# Patient Record
Sex: MALE | Race: WHITE | NOT HISPANIC OR LATINO | Employment: STUDENT | URBAN - METROPOLITAN AREA
[De-identification: names, ages, dates, MRNs, and addresses within clinical notes are randomized per-mention and may not be internally consistent; named-entity substitution may affect disease eponyms.]

---

## 2024-05-20 ENCOUNTER — OFFICE VISIT (OUTPATIENT)
Dept: PRIMARY CARE | Facility: CLINIC | Age: 19
End: 2024-05-20
Payer: COMMERCIAL

## 2024-05-20 VITALS
DIASTOLIC BLOOD PRESSURE: 72 MMHG | HEART RATE: 101 BPM | OXYGEN SATURATION: 97 % | BODY MASS INDEX: 20.04 KG/M2 | HEIGHT: 70 IN | WEIGHT: 140 LBS | SYSTOLIC BLOOD PRESSURE: 116 MMHG | TEMPERATURE: 97.7 F

## 2024-05-20 DIAGNOSIS — J02.9 PHARYNGITIS, UNSPECIFIED ETIOLOGY: Primary | ICD-10-CM

## 2024-05-20 LAB — POC RAPID MONO: NEGATIVE

## 2024-05-20 PROCEDURE — 1036F TOBACCO NON-USER: CPT | Performed by: FAMILY MEDICINE

## 2024-05-20 PROCEDURE — 99203 OFFICE O/P NEW LOW 30 MIN: CPT | Performed by: FAMILY MEDICINE

## 2024-05-20 PROCEDURE — 86308 HETEROPHILE ANTIBODY SCREEN: CPT | Mod: QW,59 | Performed by: FAMILY MEDICINE

## 2024-05-20 RX ORDER — AZITHROMYCIN 250 MG/1
TABLET, FILM COATED ORAL DAILY
Qty: 6 TABLET | Refills: 0 | Status: SHIPPED | OUTPATIENT
Start: 2024-05-20 | End: 2024-05-25

## 2024-05-20 ASSESSMENT — PATIENT HEALTH QUESTIONNAIRE - PHQ9
SUM OF ALL RESPONSES TO PHQ9 QUESTIONS 1 AND 2: 0
1. LITTLE INTEREST OR PLEASURE IN DOING THINGS: NOT AT ALL
2. FEELING DOWN, DEPRESSED OR HOPELESS: NOT AT ALL

## 2024-05-20 ASSESSMENT — PAIN SCALES - GENERAL: PAINLEVEL: 0-NO PAIN

## 2024-05-20 NOTE — ASSESSMENT & PLAN NOTE
- History and physical exam relatively reassuring with symptoms likely related to environmental factors/allergens with possible viral infection  -Monospot test performed in office today which was negative  -Supportive care advocated including good daily hydration and ibuprofen/Tylenol as needed for pain  -If symptoms persist over the course the next week, would recommend initiating antibiotic course with printed prescription for azithromycin given  -Please plan to contact office if symptoms persist

## 2024-05-20 NOTE — PROGRESS NOTES
Outpatient Visit Note    Chief Complaint   Patient presents with    Sore Throat     ST and swelling x2-3 weeks. Tried allergy medication with no relief.          HPI:  Daniel Hall is a 19 y.o. male who presents to the office as a new patient with throat complaints.    He reports approximately 3 weeks of waxing and waning throat irritation.  Denies any associated dysphagia or vocal changes.  Believes that symptoms were potentially due to environmental irritants to which she did take 1 dose of an OTC allergy medication with no noted symptom improvement.  Usually took 1 dose of Tylenol which did not seem to help.  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  Denies any associated fever, chills, postnasal drainage, sinus pressure, ear pain, wheezing or chest congestion.    Has otherwise been in good health with no significant past medical history    Current Medications  Current Outpatient Medications   Medication Instructions    azithromycin (Zithromax) 250 mg tablet Take 2 tablets (500 mg) by mouth once daily for 1 day, THEN 1 tablet (250 mg) once daily for 4 days. Take 2 tabs (500 mg) by mouth today, than 1 daily for 4 days..        Allergies  Allergies   Allergen Reactions    House Dust Itching     Itchy nose and congestion        History reviewed. No pertinent past medical history.   History reviewed. No pertinent surgical history.  Family History   Problem Relation Name Age of Onset    Cancer Mother      Hypertension Maternal Grandmother       Social History     Tobacco Use    Smoking status: Never    Smokeless tobacco: Never   Vaping Use    Vaping status: Never Used   Substance Use Topics    Alcohol use: Never    Drug use: Never       ROS  All pertinent positive symptoms are included in the history of present illness.  All other systems have been reviewed and are negative and noncontributory to this  patient's current ailments.    VITAL SIGNS  Vitals:    05/20/24 0926   BP: 116/72   Pulse: 101   Temp: 36.5 °C (97.7 °F)   SpO2: 97%       PHYSICAL EXAM  GENERAL APPEARANCE: alert and oriented, Pleasant and cooperative, No Acute Distress  HEENT: EOMI, PERRLA, tympanic membranes clear and flat bilaterally, nose clear, Oropharynx clear with MMM  NECK: Prominent gland on anterior right cervical region without overt cervical lymphadenopathy or TTP  HEART: RRR, normal S1S2, no murmurs, click or rubs  LUNGS: clear to auscultation bilaterally, no wheezes/rhonchi/rales  EXTREMITIES: no edema, normal ROM  SKIN: normal, no rash, unremarkable  NEUROLOGIC EXAM: non-focal exam  MUSCULOSKELETAL: no gross abnormalities  PSYCH: affect is normal, eye contact is good    Assessment/Plan   Problem List Items Addressed This Visit             ICD-10-CM    Pharyngitis - Primary J02.9     - History and physical exam relatively reassuring with symptoms likely related to environmental factors/allergens with possible viral infection  -Monospot test performed in office today which was negative  -Supportive care advocated including good daily hydration and ibuprofen/Tylenol as needed for pain  -If symptoms persist over the course the next week, would recommend initiating antibiotic course with printed prescription for azithromycin given  -Please plan to contact office if symptoms persist         Relevant Medications    azithromycin (Zithromax) 250 mg tablet    Other Relevant Orders    POCT Infectious Mononucleosis Antibody manually resulted (Completed)       Counseling:       Medication education:         Education:  The patient is counseled regarding potential side-effects of all new medications        Understanding:  Patient expressed understanding        Adherence:  No barriers to adherence identified    ** Please excuse any errors in grammar or translation related to this dictation. Voice recognition software was utilized to prepare this  document. **

## 2024-05-20 NOTE — PATIENT INSTRUCTIONS
Problem List Items Addressed This Visit             ICD-10-CM    Pharyngitis - Primary J02.9     - History and physical exam relatively reassuring with symptoms likely related to environmental factors/allergens with possible viral infection  -Monospot test performed in office today which was negative  -Supportive care advocated including good daily hydration and ibuprofen/Tylenol as needed for pain  -If symptoms persist over the course the next week, would recommend initiating antibiotic course with printed prescription for azithromycin given  -Please plan to contact office if symptoms persist         Relevant Medications    azithromycin (Zithromax) 250 mg tablet    Other Relevant Orders    POCT Infectious Mononucleosis Antibody manually resulted (Completed)       Counseling:       Medication education:         Education:  The patient is counseled regarding potential side-effects of all new medications        Understanding:  Patient expressed understanding        Adherence:  No barriers to adherence identified    ** Please excuse any errors in grammar or translation related to this dictation. Voice recognition software was utilized to prepare this document. **